# Patient Record
(demographics unavailable — no encounter records)

---

## 2024-10-17 NOTE — DISCUSSION/SUMMARY
[de-identified] : Great result so far from 3 months PT for ACL deficient knee with no meniscal tears.  Knee is quite stable and she is progressing even with full squat and single leg hop.  Plan  Move to full PEP prepare for sport program and add running on treadmill  and work to avoid dynamic valgus.  May work in gym without brace for outdoor running use derotation brace.  F/U 2 months for release if able.

## 2024-10-17 NOTE — PHYSICAL EXAM
[de-identified] : Left knee full AROM no tenderness neg Varus Valgus stress test and Grade 1/2 Lachman only plus neg pivot shift and Anterior draw.

## 2024-10-17 NOTE — HISTORY OF PRESENT ILLNESS
[de-identified] : AMY ALAN 33-year female Follow-up for left knee ACl tear being treated with non op Rx.  Pt states her left knee feels much better. Pt continues Physical Therapy once a week which is going great along with her at home exercises.

## 2024-12-19 NOTE — PHYSICAL EXAM
[de-identified] : Left knee full squat normal single leg hop and hop for distance equal to opposite leg.  No effusion NO joint tenderness Grade 1 Lachman

## 2024-12-19 NOTE — HISTORY OF PRESENT ILLNESS
[de-identified] : Jessica has now completed 4-5 months of non op ACL rehab including RTP program  She is doing well running with no symptoms.

## 2024-12-19 NOTE — DISCUSSION/SUMMARY
[de-identified] : Excellent functional result with non op Rx of ACL,  Ready to return to full activity. No brace needed for running walking or strength training.  Suggest ACL dero brace for any cutting sport.  F/U 6 months

## 2025-06-18 NOTE — HISTORY OF PRESENT ILLNESS
[de-identified] : AMY ALAN 34 year female Follow-up for left knee pain. pt states her left knee feels good with no pain.

## 2025-06-18 NOTE — DISCUSSION/SUMMARY
[de-identified] : 6 months post ACL treated non operatively with full function, no instability and no pain or swelling.  SHe was even able to complete half marathon with no sequelae.  Excellent result Will release from care and follow-up prn only

## 2025-06-18 NOTE — PHYSICAL EXAM
[de-identified] : Left knee 6 month post ACL injury treated fully non operatively.  Grade 1 Lacman neg pivot shift.  Quads 5/5  no tenderness no effusion